# Patient Record
Sex: MALE | Race: WHITE | NOT HISPANIC OR LATINO
[De-identification: names, ages, dates, MRNs, and addresses within clinical notes are randomized per-mention and may not be internally consistent; named-entity substitution may affect disease eponyms.]

---

## 2021-03-18 ENCOUNTER — DASHBOARD ENCOUNTERS (OUTPATIENT)
Age: 56
End: 2021-03-18

## 2021-03-18 ENCOUNTER — OFFICE VISIT (OUTPATIENT)
Dept: URBAN - METROPOLITAN AREA CLINIC 19 | Facility: CLINIC | Age: 56
End: 2021-03-18
Payer: COMMERCIAL

## 2021-03-18 DIAGNOSIS — R10.13 EPIGASTRIC ABDOMINAL PAIN: ICD-10-CM

## 2021-03-18 PROCEDURE — 99244 OFF/OP CNSLTJ NEW/EST MOD 40: CPT | Performed by: INTERNAL MEDICINE

## 2021-03-18 RX ORDER — ESZOPICLONE 3 MG/1
1 TABLET IMMEDIATELY BEFORE BEDTIME TABLET, COATED ORAL ONCE A DAY
Status: ACTIVE | COMMUNITY

## 2021-03-18 NOTE — HPI-TODAY'S VISIT:
Mr. Vaca is a 55 year old male who was referred to GI clinic by Dr. Gideon Rondon for stomach pains. A copy of this note will be sent to his referring physician.   Mr. Vaca reports the stomach pain is epigastric abdominal pain. He reports the pain feels like a chipmunk clawing in his stomach.   He reports having COVID 12/4/2020.   He reports needing Goody powders ever 2-3 days for approximately 1 year.   He reports his last EGD was approximately 10 years ago and reports being told he had inflammation in his stomach. He was prescribe protonix 20mg  yesterday but has not yet started it.   His last colonoscopy was on 5/4/2015 and repeat colonoscopy was recommended in 2025.

## 2021-04-05 ENCOUNTER — TELEPHONE ENCOUNTER (OUTPATIENT)
Dept: URBAN - METROPOLITAN AREA CLINIC 23 | Facility: CLINIC | Age: 56
End: 2021-04-05

## 2021-04-28 ENCOUNTER — TELEPHONE ENCOUNTER (OUTPATIENT)
Dept: URBAN - METROPOLITAN AREA CLINIC 19 | Facility: CLINIC | Age: 56
End: 2021-04-28

## 2021-05-04 ENCOUNTER — TELEPHONE ENCOUNTER (OUTPATIENT)
Dept: URBAN - METROPOLITAN AREA CLINIC 19 | Facility: CLINIC | Age: 56
End: 2021-05-04

## 2021-05-04 ENCOUNTER — OFFICE VISIT (OUTPATIENT)
Dept: URBAN - METROPOLITAN AREA LAB 2 | Facility: LAB | Age: 56
End: 2021-05-04

## 2021-05-18 ENCOUNTER — OFFICE VISIT (OUTPATIENT)
Dept: URBAN - METROPOLITAN AREA LAB 2 | Facility: LAB | Age: 56
End: 2021-05-18
Payer: COMMERCIAL

## 2021-05-18 DIAGNOSIS — K29.60 ADENOPAPILLOMATOSIS GASTRICA: ICD-10-CM

## 2021-05-18 PROCEDURE — 43239 EGD BIOPSY SINGLE/MULTIPLE: CPT | Performed by: INTERNAL MEDICINE

## 2021-05-18 RX ORDER — ESZOPICLONE 3 MG/1
1 TABLET IMMEDIATELY BEFORE BEDTIME TABLET, COATED ORAL ONCE A DAY
Status: ACTIVE | COMMUNITY

## 2021-05-24 ENCOUNTER — TELEPHONE ENCOUNTER (OUTPATIENT)
Dept: URBAN - METROPOLITAN AREA CLINIC 92 | Facility: CLINIC | Age: 56
End: 2021-05-24

## 2021-07-16 ENCOUNTER — OFFICE VISIT (OUTPATIENT)
Dept: URBAN - METROPOLITAN AREA CLINIC 128 | Facility: CLINIC | Age: 56
End: 2021-07-16

## 2022-12-16 ENCOUNTER — OFFICE VISIT (OUTPATIENT)
Dept: URBAN - METROPOLITAN AREA SURGERY CENTER 31 | Facility: SURGERY CENTER | Age: 57
End: 2022-12-16

## 2023-01-24 ENCOUNTER — CLAIMS CREATED FROM THE CLAIM WINDOW (OUTPATIENT)
Dept: URBAN - METROPOLITAN AREA SURGERY CENTER 31 | Facility: SURGERY CENTER | Age: 58
End: 2023-01-24
Payer: COMMERCIAL

## 2023-01-24 ENCOUNTER — CLAIMS CREATED FROM THE CLAIM WINDOW (OUTPATIENT)
Dept: URBAN - METROPOLITAN AREA CLINIC 4 | Facility: CLINIC | Age: 58
End: 2023-01-24
Payer: COMMERCIAL

## 2023-01-24 ENCOUNTER — CLAIMS CREATED FROM THE CLAIM WINDOW (OUTPATIENT)
Dept: URBAN - METROPOLITAN AREA SURGERY CENTER 31 | Facility: SURGERY CENTER | Age: 58
End: 2023-01-24

## 2023-01-24 DIAGNOSIS — K63.5 BENIGN COLON POLYP: ICD-10-CM

## 2023-01-24 DIAGNOSIS — Z12.11 COLON CANCER SCREENING: ICD-10-CM

## 2023-01-24 DIAGNOSIS — K63.89 OTHER SPECIFIED DISEASES OF INTESTINE: ICD-10-CM

## 2023-01-24 PROCEDURE — 88305 TISSUE EXAM BY PATHOLOGIST: CPT | Performed by: PATHOLOGY

## 2023-01-24 PROCEDURE — G8907 PT DOC NO EVENTS ON DISCHARG: HCPCS | Performed by: STUDENT IN AN ORGANIZED HEALTH CARE EDUCATION/TRAINING PROGRAM

## 2023-01-24 PROCEDURE — 45380 COLONOSCOPY AND BIOPSY: CPT | Performed by: STUDENT IN AN ORGANIZED HEALTH CARE EDUCATION/TRAINING PROGRAM

## 2023-12-26 ENCOUNTER — OFFICE VISIT (OUTPATIENT)
Dept: URBAN - METROPOLITAN AREA CLINIC 111 | Facility: CLINIC | Age: 58
End: 2023-12-26

## 2024-12-04 ENCOUNTER — OFFICE VISIT (OUTPATIENT)
Dept: URBAN - METROPOLITAN AREA CLINIC 19 | Facility: CLINIC | Age: 59
End: 2024-12-04
Payer: COMMERCIAL

## 2024-12-04 VITALS
HEIGHT: 72 IN | BODY MASS INDEX: 29.53 KG/M2 | WEIGHT: 218 LBS | OXYGEN SATURATION: 98 % | TEMPERATURE: 97.3 F | HEART RATE: 64 BPM | SYSTOLIC BLOOD PRESSURE: 130 MMHG | DIASTOLIC BLOOD PRESSURE: 80 MMHG

## 2024-12-04 DIAGNOSIS — K21.9 GERD: ICD-10-CM

## 2024-12-04 PROCEDURE — 99213 OFFICE O/P EST LOW 20 MIN: CPT | Performed by: NURSE PRACTITIONER

## 2024-12-04 NOTE — HPI-TODAY'S VISIT:
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 Cullen) for worsening GERD, epigastric pain.   He was last seen in GI clinic by Dr. Hewitt back in 2021 for abdominal pain.  At the time he reported taking Goody's powders every 2 to 3 days. EGD was performed by Dr. Hewitt on 5/18/2021 which showed normal esophagus, erythematous mucosa in the antrum and normal duodenum.  Path: Duodenum-negative.  Gastric antrum-nonspecific chronic inflammation; negative for H. pylori He had a colonoscopy done on 1/24/2023 by Dr. Davis which found a 3 mm (benign) polyp in the descending colon, diverticulosis and mild nonbleeding internal hemorrhoids.   He has been on Pantoprazole for many years He reports a few weeks ago, had diarrhea which resolved then started having severe epigastric pain and worsening acid reflux. Then ate a bite of brunswick stew and pain so bad with one bite he became concerned. Has been eating bland since.  Takes Goody's powder once/month. Otherwise takes Tylenol. Took Meloxicam for 2 days only (2 weeks ago) ETOH: one burbon drink every other day. Non-smoker or chewing tobacco. No illicit drugs.  No bloody or black stools. No family hx of GI or colon cancer.   No pacemaker or stents. No pulm or kidney disease.

## 2024-12-12 ENCOUNTER — OFFICE VISIT (OUTPATIENT)
Dept: URBAN - METROPOLITAN AREA SURGERY CENTER 31 | Facility: SURGERY CENTER | Age: 59
End: 2024-12-12
Payer: COMMERCIAL

## 2024-12-12 ENCOUNTER — CLAIMS CREATED FROM THE CLAIM WINDOW (OUTPATIENT)
Dept: URBAN - METROPOLITAN AREA CLINIC 4 | Facility: CLINIC | Age: 59
End: 2024-12-12
Payer: COMMERCIAL

## 2024-12-12 DIAGNOSIS — K31.89 OTHER DISEASES OF STOMACH AND DUODENUM: ICD-10-CM

## 2024-12-12 DIAGNOSIS — K31.89 ACHYLIA: ICD-10-CM

## 2024-12-12 DIAGNOSIS — K29.70 EROSIVE GASTRITIS: ICD-10-CM

## 2024-12-12 DIAGNOSIS — K31.7 BENIGN GASTRIC POLYP: ICD-10-CM

## 2024-12-12 DIAGNOSIS — R10.13 ABDOMINAL DISCOMFORT, EPIGASTRIC: ICD-10-CM

## 2024-12-12 DIAGNOSIS — K31.7 POLYP OF STOMACH AND DUODENUM: ICD-10-CM

## 2024-12-12 PROCEDURE — 88305 TISSUE EXAM BY PATHOLOGIST: CPT | Performed by: PATHOLOGY

## 2024-12-12 PROCEDURE — 88312 SPECIAL STAINS GROUP 1: CPT | Performed by: PATHOLOGY

## 2024-12-12 PROCEDURE — 43239 EGD BIOPSY SINGLE/MULTIPLE: CPT | Performed by: STUDENT IN AN ORGANIZED HEALTH CARE EDUCATION/TRAINING PROGRAM

## 2024-12-12 PROCEDURE — 00731 ANES UPR GI NDSC PX NOS: CPT | Performed by: NURSE ANESTHETIST, CERTIFIED REGISTERED

## 2025-01-03 ENCOUNTER — OFFICE VISIT (OUTPATIENT)
Dept: URBAN - METROPOLITAN AREA CLINIC 19 | Facility: CLINIC | Age: 60
End: 2025-01-03

## 2025-01-15 ENCOUNTER — OFFICE VISIT (OUTPATIENT)
Dept: URBAN - METROPOLITAN AREA CLINIC 19 | Facility: CLINIC | Age: 60
End: 2025-01-15
Payer: COMMERCIAL

## 2025-01-15 VITALS
OXYGEN SATURATION: 100 % | TEMPERATURE: 98.6 F | BODY MASS INDEX: 29.61 KG/M2 | HEIGHT: 72 IN | WEIGHT: 218.6 LBS | DIASTOLIC BLOOD PRESSURE: 80 MMHG | HEART RATE: 44 BPM | SYSTOLIC BLOOD PRESSURE: 120 MMHG

## 2025-01-15 DIAGNOSIS — K25.3 ACUTE GASTRIC EROSION: ICD-10-CM

## 2025-01-15 DIAGNOSIS — R10.13 EPIGASTRIC ABDOMINAL PAIN: ICD-10-CM

## 2025-01-15 DIAGNOSIS — Z79.1 NSAID LONG-TERM USE: ICD-10-CM

## 2025-01-15 PROCEDURE — 99212 OFFICE O/P EST SF 10 MIN: CPT | Performed by: STUDENT IN AN ORGANIZED HEALTH CARE EDUCATION/TRAINING PROGRAM

## 2025-01-15 NOTE — HPI-TODAY'S VISIT:
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 Cullen) for worsening GERD, epigastric pain.  Here for follow up after upper endoscopy.   Today patient doing well since the last endoscopy. Patient has stopped taking his Goody powders but still taking meloxicam and almost finished with his dose. His abdominal pain is almost gone now. He denies any nausea and vomiting. He thinks that his abdominal pain is likely from the ulcers/erosions from NSAIDs.  Last clinic vist  He indicated he was having worsening epigastric pain and reflux like symptom. EGD was ordered.    Previous Endoscopy hx  in GI clinic by Dr. Hewitt back in 2021 for abdominal pain days. EGD was performed by Dr. Hewitt on 5/18/2021 which showed normal esophagus, erythematous mucosa in the antrum and normal duodenum.  Path: Duodenum-negative.  Gastric antrum-nonspecific chronic inflammation; negative for H. pylori He had a colonoscopy done on 1/24/2023 by Dr. Davis which found a 3 mm (benign) polyp in the descending colon, diverticulosis and mild nonbleeding internal hemorrhoids.   ETOH: one burbon drink every other day. Non-smoker or chewing tobacco. No illicit drugs.  No bloody or black stools. No family hx of GI or colon cancer. No pacemaker or stents. No pulm or kidney disease.  EGD done on 12/12/2024 by Dr Henderson  Few erosions were seen in the antrum of the stomach No endoscopic evidence of esophagitis Z-line was regular Pathology showing no H. pylori and fundic gland polyps.